# Patient Record
Sex: FEMALE | Race: WHITE | NOT HISPANIC OR LATINO | URBAN - METROPOLITAN AREA
[De-identification: names, ages, dates, MRNs, and addresses within clinical notes are randomized per-mention and may not be internally consistent; named-entity substitution may affect disease eponyms.]

---

## 2017-10-26 ENCOUNTER — 1 YR (OUTPATIENT)
Dept: URBAN - METROPOLITAN AREA CLINIC 27 | Facility: CLINIC | Age: 82
Setting detail: DERMATOLOGY
End: 2017-10-26

## 2017-10-26 DIAGNOSIS — L57.0 ACTINIC KERATOSIS: ICD-10-CM

## 2017-10-26 PROCEDURE — 17003 DESTRUCT PREMALG LES 2-14: CPT

## 2017-10-26 PROCEDURE — 17000 DESTRUCT PREMALG LESION: CPT

## 2017-10-26 PROCEDURE — 99213 OFFICE O/P EST LOW 20 MIN: CPT

## 2018-10-29 ENCOUNTER — 1 YR (OUTPATIENT)
Dept: URBAN - METROPOLITAN AREA CLINIC 27 | Facility: CLINIC | Age: 83
Setting detail: DERMATOLOGY
End: 2018-10-29

## 2018-10-29 DIAGNOSIS — D04.72 CARCINOMA IN SITU OF SKIN OF LEFT LOWER LIMB, INCLUDING HIP: ICD-10-CM

## 2018-10-29 PROCEDURE — 99213 OFFICE O/P EST LOW 20 MIN: CPT

## 2018-10-29 PROCEDURE — 17003 DESTRUCT PREMALG LES 2-14: CPT

## 2018-10-29 PROCEDURE — 17000 DESTRUCT PREMALG LESION: CPT

## 2019-11-19 ENCOUNTER — 1 YR (OUTPATIENT)
Dept: URBAN - METROPOLITAN AREA CLINIC 27 | Facility: CLINIC | Age: 84
Setting detail: DERMATOLOGY
End: 2019-11-19

## 2019-11-19 DIAGNOSIS — L82.1 OTHER SEBORRHEIC KERATOSIS: ICD-10-CM

## 2019-11-19 PROBLEM — R23.3 SPONTANEOUS ECCHYMOSES: Status: RESOLVED | Noted: 2019-11-19

## 2019-11-19 PROBLEM — L30.9 DERMATITIS, UNSPECIFIED: Status: RESOLVED | Noted: 2019-11-19

## 2019-11-19 PROCEDURE — 17000 DESTRUCT PREMALG LESION: CPT

## 2019-11-19 PROCEDURE — 17003 DESTRUCT PREMALG LES 2-14: CPT

## 2019-11-19 PROCEDURE — 99213 OFFICE O/P EST LOW 20 MIN: CPT

## 2020-11-03 ENCOUNTER — 1 YR (OUTPATIENT)
Dept: URBAN - METROPOLITAN AREA CLINIC 27 | Facility: CLINIC | Age: 85
Setting detail: DERMATOLOGY
End: 2020-11-03

## 2020-11-03 DIAGNOSIS — D03.62 MELANOMA IN SITU OF LEFT UPPER LIMB, INCLUDING SHOULDER: ICD-10-CM

## 2020-11-03 PROCEDURE — 99213 OFFICE O/P EST LOW 20 MIN: CPT

## 2021-04-19 LAB — SARS-COV-2, NAA: NEGATIVE

## 2021-05-10 ENCOUNTER — TELEPHONE (OUTPATIENT)
Dept: CARDIOLOGY CLINIC | Age: 86
End: 2021-05-10

## 2021-05-10 ENCOUNTER — OFFICE VISIT (OUTPATIENT)
Dept: CARDIOLOGY CLINIC | Age: 86
End: 2021-05-10
Payer: MEDICARE

## 2021-05-10 VITALS
HEART RATE: 98 BPM | OXYGEN SATURATION: 96 % | WEIGHT: 144 LBS | RESPIRATION RATE: 18 BRPM | BODY MASS INDEX: 23.14 KG/M2 | HEIGHT: 66 IN | SYSTOLIC BLOOD PRESSURE: 146 MMHG | DIASTOLIC BLOOD PRESSURE: 76 MMHG

## 2021-05-10 DIAGNOSIS — I48.0 PAF (PAROXYSMAL ATRIAL FIBRILLATION) (HCC): ICD-10-CM

## 2021-05-10 DIAGNOSIS — J90 PLEURAL EFFUSION: ICD-10-CM

## 2021-05-10 DIAGNOSIS — R58 ECCHYMOSIS ON EXAMINATION: ICD-10-CM

## 2021-05-10 DIAGNOSIS — R09.89 BRUIT OF RIGHT CAROTID ARTERY: ICD-10-CM

## 2021-05-10 DIAGNOSIS — R07.9 CHEST PAIN, UNSPECIFIED TYPE: Primary | ICD-10-CM

## 2021-05-10 DIAGNOSIS — D61.3 IDIOPATHIC APLASTIC ANEMIA (HCC): ICD-10-CM

## 2021-05-10 PROBLEM — D61.9 ANEMIA DUE TO BONE MARROW FAILURE (HCC): Status: ACTIVE | Noted: 2021-05-10

## 2021-05-10 PROCEDURE — 93005 ELECTROCARDIOGRAM TRACING: CPT | Performed by: SPECIALIST

## 2021-05-10 PROCEDURE — G8510 SCR DEP NEG, NO PLAN REQD: HCPCS | Performed by: SPECIALIST

## 2021-05-10 PROCEDURE — G8420 CALC BMI NORM PARAMETERS: HCPCS | Performed by: SPECIALIST

## 2021-05-10 PROCEDURE — 1090F PRES/ABSN URINE INCON ASSESS: CPT | Performed by: SPECIALIST

## 2021-05-10 PROCEDURE — 99205 OFFICE O/P NEW HI 60 MIN: CPT | Performed by: SPECIALIST

## 2021-05-10 PROCEDURE — 93010 ELECTROCARDIOGRAM REPORT: CPT | Performed by: SPECIALIST

## 2021-05-10 PROCEDURE — G8427 DOCREV CUR MEDS BY ELIG CLIN: HCPCS | Performed by: SPECIALIST

## 2021-05-10 PROCEDURE — G8536 NO DOC ELDER MAL SCRN: HCPCS | Performed by: SPECIALIST

## 2021-05-10 PROCEDURE — G0463 HOSPITAL OUTPT CLINIC VISIT: HCPCS | Performed by: SPECIALIST

## 2021-05-10 PROCEDURE — 1101F PT FALLS ASSESS-DOCD LE1/YR: CPT | Performed by: SPECIALIST

## 2021-05-10 RX ORDER — LANOLIN ALCOHOL/MO/W.PET/CERES
325 CREAM (GRAM) TOPICAL
COMMUNITY

## 2021-05-10 RX ORDER — PANTOPRAZOLE SODIUM 40 MG/1
40 TABLET, DELAYED RELEASE ORAL DAILY
COMMUNITY
End: 2021-08-25 | Stop reason: ALTCHOICE

## 2021-05-10 RX ORDER — ASCORBIC ACID 250 MG
250 TABLET ORAL
COMMUNITY

## 2021-05-10 RX ORDER — SUCRALFATE 1 G/1
1 TABLET ORAL 4 TIMES DAILY
COMMUNITY
End: 2021-08-25 | Stop reason: ALTCHOICE

## 2021-05-10 RX ORDER — METOPROLOL TARTRATE 25 MG/1
25 TABLET, FILM COATED ORAL 2 TIMES DAILY
COMMUNITY
End: 2021-06-02

## 2021-05-10 RX ORDER — LEVOTHYROXINE SODIUM 50 UG/1
50 TABLET ORAL
COMMUNITY

## 2021-05-10 RX ORDER — SIMVASTATIN 10 MG/1
TABLET, FILM COATED ORAL
COMMUNITY

## 2021-05-10 RX ORDER — LANOLIN ALCOHOL/MO/W.PET/CERES
3 CREAM (GRAM) TOPICAL
COMMUNITY

## 2021-05-10 RX ORDER — ACETAMINOPHEN 325 MG/1
325 TABLET ORAL
COMMUNITY

## 2021-05-10 RX ORDER — TORSEMIDE 10 MG/1
10 TABLET ORAL DAILY
COMMUNITY
End: 2021-07-20 | Stop reason: ALTCHOICE

## 2021-05-10 RX ORDER — NITROGLYCERIN 0.4 MG/1
0.4 TABLET SUBLINGUAL
COMMUNITY

## 2021-05-10 RX ORDER — LANOLIN ALCOHOL/MO/W.PET/CERES
1000 CREAM (GRAM) TOPICAL DAILY
COMMUNITY

## 2021-05-10 NOTE — TELEPHONE ENCOUNTER
Faxed records request to [facility] in [place] fax # [phone] and phone #[phone] and [phone]

## 2021-05-10 NOTE — PROGRESS NOTES
HISTORY OF PRESENT ILLNESS  Jad Copeland is a 80 y.o. female. She is seen for initial evaluation along with her  and her daughter. She was living in [facility] but spending part of the year in [place]. She has been in and out of the hospital in [place] in the [facility] area for the past 2-1/2 months. She was eventually transported up here by medical transport 10 days ago and now is at an independent living facility. She initially had chest pain in January and went to the hospital and was given a GI cocktail and sent home and did fine for 1 month. She then had recurrent severe chest pain and went back to the hospital in February she was treated for pneumonia and may have had atrial fibrillation at that time but never was told that she had a heart attack. She had an echocardiogram at some point. She was transferred to another hospital for possible catheterization of her heart but this never happened. She did have a stress test done in [place] last December and was told it was normal.  She also has a bruit in her right carotid and there was some thought given to performing endarterectomy. She was told it was a 90% lesion. She has never had symptoms with this. She had profound anemia and underwent upper endoscopy as well as abdominal CT scan but nothing was found. However she was placed on Carafate and Protonix and also iron therapy. She never had a colonoscopy. She has a solitary kidney for unclear reasons. Recent blood work done here in [place] shows normal renal function but does show that she has profound anemia which is not microcytic. Her hemoglobin is 8.2 with hematocrit of 2 5.7%. Platelets are normal although she does have easy bruisability. She also gives a history of thoracentesis at least 2 occasions done recently in [place]. Approximately a liter was drained each time. She was never told that she had heart failure.   She used to take aspirin but it was stopped and she would like to restart it. 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  cyanocobalamin 1,000 mcg tablet Take 1,000 mcg by mouth daily.  ferrous sulfate 325 mg (65 mg iron) tablet Take 325 mg by mouth Daily (before breakfast).  levothyroxine (SYNTHROID) 50 mcg tablet Take 50 mcg by mouth Daily (before breakfast).  melatonin 3 mg tablet Take 3 mg by mouth.  metoprolol tartrate (LOPRESSOR) 25 mg tablet Take 25 mg by mouth two (2) times a day.  nitroglycerin (NITROSTAT) 0.4 mg SL tablet 0.4 mg by SubLINGual route every five (5) minutes as needed for Chest Pain. Up to 3 doses.  pantoprazole (Protonix) 40 mg tablet Take 40 mg by mouth daily.  simvastatin (ZOCOR) 10 mg tablet Take  by mouth nightly.  sucralfate (CARAFATE) 1 gram tablet Take 1 g by mouth four (4) times daily.  torsemide (DEMADEX) 10 mg tablet Take  by mouth daily.  acetaminophen (TylenoL) 325 mg tablet Take 325 mg by mouth every four (4) hours as needed for Pain. No past medical history on file. No past surgical history on file. Review of Systems   Constitutional: Positive for malaise/fatigue. Endo/Heme/Allergies: Bruises/bleeds easily. All other systems reviewed and are negative. Visit Vitals  BP (!) 146/76 (BP 1 Location: Left upper arm, BP Patient Position: Sitting, BP Cuff Size: Adult)   Pulse 98   Resp 18   Ht 5' 6\" (1.676 m)   Wt 144 lb (65.3 kg)   SpO2 96%   BMI 23.24 kg/m²       Physical Exam   Constitutional: She is oriented to person, place, and time. She appears well-nourished.    HENT: Head: Normocephalic. Eyes: Conjunctivae are normal.   Neck: Normal range of motion. Cardiovascular: Normal rate, regular rhythm and normal heart sounds. Exam reveals no gallop and no friction rub. No murmur heard. Pulmonary/Chest: Effort normal. She has no wheezes. She has no rales. Abdominal: Bowel sounds are normal.   Musculoskeletal:         General: No edema. Neurological: She is alert and oriented to person, place, and time. Skin: There is erythema. There is pallor. Psychiatric: Her behavior is normal.   Nursing note and vitals reviewed. ASSESSMENT and PLAN  She has profound anemia of unclear etiology and I suspect this is contributing to her symptoms. I will refer her to a hematologist in this regard. After her hospital discharge in [place] she did see a hematologist and multiple vials of blood were drawn but the daughter does not know the results. We will try to obtain these records today. We will also try to obtain records from her hospitals in [place] before performing any further cardiology testing at this point. She does have a right carotid bruit and I will suggest that she restart a baby aspirin today. She still has fatigue but this may be due to the anemia. The chest pain that she initially had is gone and this may have been due to esophageal spasm. However coronary disease cannot be excluded given her age and her evidence for carotid disease. I will see her back in about 3 weeks time hopefully after information can be obtained from [place].

## 2021-06-02 ENCOUNTER — OFFICE VISIT (OUTPATIENT)
Dept: CARDIOLOGY CLINIC | Age: 86
End: 2021-06-02
Payer: MEDICARE

## 2021-06-02 VITALS
HEIGHT: 66 IN | WEIGHT: 142 LBS | BODY MASS INDEX: 22.82 KG/M2 | DIASTOLIC BLOOD PRESSURE: 78 MMHG | OXYGEN SATURATION: 96 % | SYSTOLIC BLOOD PRESSURE: 138 MMHG | HEART RATE: 84 BPM | RESPIRATION RATE: 18 BRPM

## 2021-06-02 DIAGNOSIS — I48.0 PAF (PAROXYSMAL ATRIAL FIBRILLATION) (HCC): ICD-10-CM

## 2021-06-02 DIAGNOSIS — D64.9 ANEMIA, UNSPECIFIED TYPE: Primary | ICD-10-CM

## 2021-06-02 DIAGNOSIS — R09.89 BRUIT OF RIGHT CAROTID ARTERY: ICD-10-CM

## 2021-06-02 DIAGNOSIS — J90 PLEURAL EFFUSION: ICD-10-CM

## 2021-06-02 DIAGNOSIS — D61.3 IDIOPATHIC APLASTIC ANEMIA (HCC): ICD-10-CM

## 2021-06-02 PROCEDURE — G8420 CALC BMI NORM PARAMETERS: HCPCS | Performed by: SPECIALIST

## 2021-06-02 PROCEDURE — 1101F PT FALLS ASSESS-DOCD LE1/YR: CPT | Performed by: SPECIALIST

## 2021-06-02 PROCEDURE — G8536 NO DOC ELDER MAL SCRN: HCPCS | Performed by: SPECIALIST

## 2021-06-02 PROCEDURE — G8510 SCR DEP NEG, NO PLAN REQD: HCPCS | Performed by: SPECIALIST

## 2021-06-02 PROCEDURE — 99215 OFFICE O/P EST HI 40 MIN: CPT | Performed by: SPECIALIST

## 2021-06-02 PROCEDURE — G0463 HOSPITAL OUTPT CLINIC VISIT: HCPCS | Performed by: SPECIALIST

## 2021-06-02 PROCEDURE — G8427 DOCREV CUR MEDS BY ELIG CLIN: HCPCS | Performed by: SPECIALIST

## 2021-06-02 PROCEDURE — 1090F PRES/ABSN URINE INCON ASSESS: CPT | Performed by: SPECIALIST

## 2021-06-02 RX ORDER — METOPROLOL TARTRATE 50 MG/1
50 TABLET ORAL 2 TIMES DAILY
Qty: 60 TABLET | Refills: 5 | Status: SHIPPED | OUTPATIENT
Start: 2021-06-02 | End: 2021-07-13

## 2021-06-02 NOTE — PROGRESS NOTES
HISTORY OF PRESENT ILLNESS  Guerrero Sullivan is a 80 y.o. female. She was seen recently initially after moving here from [place]. She had multiple recent hospitalizations in the [facility] for atrial fibrillation anemia pneumonia and pleural effusions. I reviewed her records today. She did have episodes of atrial fibrillation noted but was not started on anticoagulation because of a profound anemia. She also had pneumonia and required 2 different thoracenteses. She has seen Dr. Kylie Dunaway who did a bone marrow biopsy and she will get the results in 2 days. Gastrointestinal evaluation showed only esophagitis. She is doing better in general and has more energy although yesterday had some dizziness at rest for unclear reasons lasting several minutes. When I first saw her an EKG showed sinus rhythm. Echocardiography in [place] showed normal heart function. 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 60 Tablet 5    ascorbic acid, vitamin C, (VITAMIN C) 250 mg tablet Take 250 mg by mouth.  cyanocobalamin 1,000 mcg tablet Take 1,000 mcg by mouth daily.  ferrous sulfate 325 mg (65 mg iron) tablet Take 325 mg by mouth Daily (before breakfast).  levothyroxine (SYNTHROID) 50 mcg tablet Take 50 mcg by mouth Daily (before breakfast).  pantoprazole (Protonix) 40 mg tablet Take 40 mg by mouth daily.  simvastatin (ZOCOR) 10 mg tablet Take  by mouth nightly.  sucralfate (CARAFATE) 1 gram tablet Take 1 g by mouth four (4) times daily.  torsemide (DEMADEX) 10 mg tablet Take  by mouth daily.       melatonin 3 mg tablet Take 3 mg by mouth. (Patient not taking: Reported on 6/2/2021)      nitroglycerin (NITROSTAT) 0.4 mg SL tablet 0.4 mg by SubLINGual route every five (5) minutes as needed for Chest Pain. Up to 3 doses. (Patient not taking: Reported on 6/2/2021)      acetaminophen (TylenoL) 325 mg tablet Take 325 mg by mouth every four (4) hours as needed for Pain. (Patient not taking: Reported on 6/2/2021)       No past medical history on file. No past surgical history on file. Review of Systems   Neurological: Positive for dizziness. All other systems reviewed and are negative. Visit Vitals  /78 (BP 1 Location: Right arm, BP Patient Position: Sitting, BP Cuff Size: Adult)   Pulse 84   Resp 18   Ht 5' 6\" (1.676 m)   Wt 142 lb (64.4 kg)   SpO2 96%   BMI 22.92 kg/m²       Physical Exam  Vitals and nursing note reviewed. Constitutional:       Appearance: Normal appearance. She is normal weight. HENT:      Head: Normocephalic. Right Ear: External ear normal.      Left Ear: External ear normal.      Nose: Nose normal.      Mouth/Throat:      Mouth: Mucous membranes are moist.   Eyes:      Extraocular Movements: Extraocular movements intact. Cardiovascular:      Rate and Rhythm: Normal rate and regular rhythm. Occasional extrasystoles are present. Pulses: Normal pulses. Heart sounds: Normal heart sounds. Pulmonary:      Breath sounds: Normal breath sounds. Abdominal:      Palpations: Abdomen is soft. Musculoskeletal:         General: Normal range of motion. Cervical back: Normal range of motion. Skin:     General: Skin is warm. Neurological:      General: No focal deficit present. Mental Status: She is alert. Psychiatric:         Mood and Affect: Mood normal.         ASSESSMENT and PLAN  It is unclear why she has profound anemia. She is having some short burst of what I assume to be atrial ectopic beats on exam.  Otherwise she is regular.   Because of the history of atrial fibrillation I will increase her metoprolol from 25 up to 50 mg twice daily and see her back in 4 to 6 weeks. I will have the results of the bone marrow biopsy at that time. It is possible that she should be placed on anticoagulation as long as there is no evidence for blood loss.

## 2021-07-13 ENCOUNTER — OFFICE VISIT (OUTPATIENT)
Dept: CARDIOLOGY CLINIC | Age: 86
End: 2021-07-13
Payer: MEDICARE

## 2021-07-13 VITALS
HEART RATE: 58 BPM | DIASTOLIC BLOOD PRESSURE: 66 MMHG | BODY MASS INDEX: 23.46 KG/M2 | OXYGEN SATURATION: 97 % | WEIGHT: 146 LBS | HEIGHT: 66 IN | RESPIRATION RATE: 16 BRPM | SYSTOLIC BLOOD PRESSURE: 110 MMHG

## 2021-07-13 DIAGNOSIS — I48.0 PAF (PAROXYSMAL ATRIAL FIBRILLATION) (HCC): ICD-10-CM

## 2021-07-13 DIAGNOSIS — D61.3 IDIOPATHIC APLASTIC ANEMIA (HCC): ICD-10-CM

## 2021-07-13 DIAGNOSIS — I10 ESSENTIAL HYPERTENSION: ICD-10-CM

## 2021-07-13 DIAGNOSIS — I95.1 ORTHOSTATIC HYPOTENSION: Primary | ICD-10-CM

## 2021-07-13 DIAGNOSIS — J90 PLEURAL EFFUSION: ICD-10-CM

## 2021-07-13 DIAGNOSIS — R09.89 BRUIT OF RIGHT CAROTID ARTERY: ICD-10-CM

## 2021-07-13 DIAGNOSIS — D64.9 ANEMIA, UNSPECIFIED TYPE: ICD-10-CM

## 2021-07-13 DIAGNOSIS — R00.2 PALPITATIONS: ICD-10-CM

## 2021-07-13 PROCEDURE — G8536 NO DOC ELDER MAL SCRN: HCPCS | Performed by: SPECIALIST

## 2021-07-13 PROCEDURE — 99214 OFFICE O/P EST MOD 30 MIN: CPT | Performed by: SPECIALIST

## 2021-07-13 PROCEDURE — G0463 HOSPITAL OUTPT CLINIC VISIT: HCPCS | Performed by: SPECIALIST

## 2021-07-13 PROCEDURE — G8427 DOCREV CUR MEDS BY ELIG CLIN: HCPCS | Performed by: SPECIALIST

## 2021-07-13 PROCEDURE — G8510 SCR DEP NEG, NO PLAN REQD: HCPCS | Performed by: SPECIALIST

## 2021-07-13 PROCEDURE — G8420 CALC BMI NORM PARAMETERS: HCPCS | Performed by: SPECIALIST

## 2021-07-13 PROCEDURE — 1090F PRES/ABSN URINE INCON ASSESS: CPT | Performed by: SPECIALIST

## 2021-07-13 PROCEDURE — 1101F PT FALLS ASSESS-DOCD LE1/YR: CPT | Performed by: SPECIALIST

## 2021-07-13 RX ORDER — METOPROLOL TARTRATE 25 MG/1
25 TABLET, FILM COATED ORAL 2 TIMES DAILY
Qty: 180 TABLET | Refills: 3 | Status: SHIPPED | OUTPATIENT
Start: 2021-07-13 | End: 2021-07-27 | Stop reason: SDUPTHER

## 2021-07-13 NOTE — PROGRESS NOTES
HISTORY OF PRESENT ILLNESS  Camron Vasquez is a 80 y.o. female. She has significant anemia diagnosed in [place]. At that time she also had palpitations and possible atrial fibrillation as well as pleural effusions. I did review records from there but cannot locate them at present but it seems to me her heart function was normal then. Recently she has been having some lightheadedness and I reviewed her blood pressures in the office today. They are as high as 832 systolic and as low as 99 systolic. She was on torsemide 10 mg but takes it every other day. I had increased her metoprolol from 25 to 50 mg twice daily for some palpitations but she thinks it is making her blood pressure low and she has been taking the 25 mg. She has been seen by Dr. Jazzy Medellin who did a bone marrow and told her that she may have myelodysplasia or possibly a refractory anemia with ringed sideroblasts. She bruises easily and has 1 kidney. I did review her CBC today which shows hematocrit of 30 or 32% but I do not have any chemistries. 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 180 Tablet 3    ascorbic acid, vitamin C, (VITAMIN C) 250 mg tablet Take 250 mg by mouth.  cyanocobalamin 1,000 mcg tablet Take 1,000 mcg by mouth daily.  ferrous sulfate 325 mg (65 mg iron) tablet Take 325 mg by mouth Daily (before breakfast).  levothyroxine (SYNTHROID) 50 mcg tablet Take 50 mcg by mouth Daily (before breakfast).  nitroglycerin (NITROSTAT) 0.4 mg SL tablet 0.4 mg by SubLINGual route every five (5) minutes as needed for Chest Pain. Up to 3 doses.  simvastatin (ZOCOR) 10 mg tablet Take  by mouth nightly.  torsemide (DEMADEX) 10 mg tablet Take 10 mg by mouth daily. Every other day      melatonin 3 mg tablet Take 3 mg by mouth. (Patient not taking: Reported on 7/13/2021)      pantoprazole (Protonix) 40 mg tablet Take 40 mg by mouth daily. (Patient not taking: Reported on 7/13/2021)      sucralfate (CARAFATE) 1 gram tablet Take 1 g by mouth four (4) times daily. (Patient not taking: Reported on 7/13/2021)      acetaminophen (TylenoL) 325 mg tablet Take 325 mg by mouth every four (4) hours as needed for Pain. (Patient not taking: Reported on 6/2/2021)       No past medical history on file. No past surgical history on file. Review of Systems   Neurological: Positive for dizziness. Endo/Heme/Allergies: Bruises/bleeds easily. All other systems reviewed and are negative. Visit Vitals  /66 (BP 1 Location: Right arm, BP Patient Position: Standing, BP Cuff Size: Adult)   Pulse (!) 58   Resp 16   Ht 5' 6\" (1.676 m)   Wt 146 lb (66.2 kg)   SpO2 97%   BMI 23.57 kg/m²       Physical Exam  Vitals and nursing note reviewed. Constitutional:       Appearance: Normal appearance. She is normal weight. HENT:      Head: Normocephalic. Right Ear: External ear normal.      Left Ear: External ear normal.      Nose: Nose normal.      Mouth/Throat:      Mouth: Mucous membranes are moist.   Eyes:      Extraocular Movements: Extraocular movements intact. Cardiovascular:      Rate and Rhythm: Normal rate and regular rhythm. Occasional extrasystoles are present. Heart sounds: Normal heart sounds. Pulmonary:      Breath sounds: Normal breath sounds. Abdominal:      Palpations: Abdomen is soft. Musculoskeletal:         General: No swelling. Normal range of motion. Cervical back: Normal range of motion. Skin:     Findings: Bruising present. Neurological:      General: No focal deficit present.       Mental Status: She is alert. Psychiatric:         Mood and Affect: Mood normal.         ASSESSMENT and PLAN  It is unclear if she is having recurrence of atrial fibrillation although I do suspect she has an element of sick sinus syndrome. I told her that she can take to metoprolol 25 mg twice daily for now and to try to reduce the torsemide and take it not every other day but maybe once or twice a week. I am not sure she needs this medication. I will order a 48-hour monitor to see if she is having atrial fibrillation. It may be problematic to treat her with an anticoagulant medication due to her easy bruising.   I will also order some electrolytes and a thyroid panel and have her return to clinic for an echocardiogram.

## 2021-07-20 ENCOUNTER — ANCILLARY PROCEDURE (OUTPATIENT)
Dept: CARDIOLOGY CLINIC | Age: 86
End: 2021-07-20
Payer: MEDICARE

## 2021-07-20 ENCOUNTER — CLINICAL SUPPORT (OUTPATIENT)
Dept: CARDIOLOGY CLINIC | Age: 86
End: 2021-07-20

## 2021-07-20 ENCOUNTER — OFFICE VISIT (OUTPATIENT)
Dept: CARDIOLOGY CLINIC | Age: 86
End: 2021-07-20
Payer: MEDICARE

## 2021-07-20 VITALS
BODY MASS INDEX: 23.46 KG/M2 | OXYGEN SATURATION: 100 % | DIASTOLIC BLOOD PRESSURE: 70 MMHG | RESPIRATION RATE: 18 BRPM | HEART RATE: 74 BPM | WEIGHT: 146 LBS | SYSTOLIC BLOOD PRESSURE: 142 MMHG | HEIGHT: 66 IN

## 2021-07-20 VITALS
DIASTOLIC BLOOD PRESSURE: 66 MMHG | SYSTOLIC BLOOD PRESSURE: 110 MMHG | WEIGHT: 146 LBS | HEIGHT: 66 IN | BODY MASS INDEX: 23.46 KG/M2

## 2021-07-20 DIAGNOSIS — I48.0 PAF (PAROXYSMAL ATRIAL FIBRILLATION) (HCC): ICD-10-CM

## 2021-07-20 DIAGNOSIS — R00.2 PALPITATIONS: Primary | ICD-10-CM

## 2021-07-20 DIAGNOSIS — D61.9 ANEMIA DUE TO BONE MARROW FAILURE, UNSPECIFIED BONE MARROW FAILURE TYPE (HCC): ICD-10-CM

## 2021-07-20 DIAGNOSIS — R09.89 BRUIT OF RIGHT CAROTID ARTERY: ICD-10-CM

## 2021-07-20 DIAGNOSIS — R00.2 PALPITATIONS: ICD-10-CM

## 2021-07-20 DIAGNOSIS — I95.1 ORTHOSTATIC HYPOTENSION: ICD-10-CM

## 2021-07-20 DIAGNOSIS — D61.3 IDIOPATHIC APLASTIC ANEMIA (HCC): ICD-10-CM

## 2021-07-20 DIAGNOSIS — I10 ESSENTIAL HYPERTENSION: ICD-10-CM

## 2021-07-20 DIAGNOSIS — J90 PLEURAL EFFUSION: ICD-10-CM

## 2021-07-20 LAB
ECHO AO ASC DIAM: 2.79 CM
ECHO AO ROOT DIAM: 2.96 CM
ECHO AV AREA PEAK VELOCITY: 2.48 CM2
ECHO AV AREA VTI: 2.25 CM2
ECHO AV AREA/BSA PEAK VELOCITY: 1.4 CM2/M2
ECHO AV AREA/BSA VTI: 1.3 CM2/M2
ECHO AV MEAN GRADIENT: 5.75 MMHG
ECHO AV PEAK GRADIENT: 11 MMHG
ECHO AV PEAK VELOCITY: 165.83 CM/S
ECHO AV VTI: 44.39 CM
ECHO EST RA PRESSURE: 3 MMHG
ECHO IVC PROX: 1.83 CM
ECHO LA AREA 4C: 25.77 CM2
ECHO LA MAJOR AXIS: 3.27 CM
ECHO LA MINOR AXIS: 1.87 CM
ECHO LA VOL 2C: 63 ML (ref 22–52)
ECHO LA VOL 4C: 82.16 ML (ref 22–52)
ECHO LA VOL BP: 76.22 ML (ref 22–52)
ECHO LA VOL/BSA BIPLANE: 43.55 ML/M2 (ref 16–28)
ECHO LA VOLUME INDEX A2C: 36 ML/M2 (ref 16–28)
ECHO LA VOLUME INDEX A4C: 46.95 ML/M2 (ref 16–28)
ECHO LV EDV A2C: 117.22 ML
ECHO LV EDV A4C: 97.59 ML
ECHO LV EDV BP: 107.38 ML (ref 56–104)
ECHO LV EDV INDEX A4C: 55.8 ML/M2
ECHO LV EDV INDEX BP: 61.4 ML/M2
ECHO LV EDV NDEX A2C: 67 ML/M2
ECHO LV EJECTION FRACTION A2C: 64 PERCENT
ECHO LV EJECTION FRACTION A4C: 52 PERCENT
ECHO LV EJECTION FRACTION BIPLANE: 58.2 PERCENT (ref 55–100)
ECHO LV ESV A2C: 42.74 ML
ECHO LV ESV A4C: 46.85 ML
ECHO LV ESV BP: 44.93 ML (ref 19–49)
ECHO LV ESV INDEX A2C: 24.4 ML/M2
ECHO LV ESV INDEX A4C: 26.8 ML/M2
ECHO LV ESV INDEX BP: 25.7 ML/M2
ECHO LV INTERNAL DIMENSION DIASTOLIC: 4.34 CM (ref 3.9–5.3)
ECHO LV INTERNAL DIMENSION SYSTOLIC: 2.97 CM
ECHO LV IVSD: 0.96 CM (ref 0.6–0.9)
ECHO LV MASS 2D: 147.6 G (ref 67–162)
ECHO LV MASS INDEX 2D: 84.4 G/M2 (ref 43–95)
ECHO LV POSTERIOR WALL DIASTOLIC: 1.07 CM (ref 0.6–0.9)
ECHO LVOT DIAM: 1.98 CM
ECHO LVOT PEAK GRADIENT: 7.06 MMHG
ECHO LVOT PEAK VELOCITY: 132.9 CM/S
ECHO LVOT SV: 99.9 ML
ECHO LVOT VTI: 32.35 CM
ECHO RA MAJOR AXIS: 3.69 CM
ECHO RIGHT VENTRICULAR SYSTOLIC PRESSURE (RVSP): 33 MMHG
ECHO RV INTERNAL DIMENSION: 3.58 CM
ECHO RV TAPSE: 2.38 CM (ref 1.5–2)
ECHO TV REGURGITANT MAX VELOCITY: 275.72 CM/S
ECHO TV REGURGITANT PEAK GRADIENT: 30.43 MMHG
LA VOL DISK BP: 72.56 ML (ref 22–52)
LVOT MG: 3.56 MMHG

## 2021-07-20 PROCEDURE — 1090F PRES/ABSN URINE INCON ASSESS: CPT | Performed by: SPECIALIST

## 2021-07-20 PROCEDURE — 93227 XTRNL ECG REC<48 HR R&I: CPT | Performed by: SPECIALIST

## 2021-07-20 PROCEDURE — G8536 NO DOC ELDER MAL SCRN: HCPCS | Performed by: SPECIALIST

## 2021-07-20 PROCEDURE — 1101F PT FALLS ASSESS-DOCD LE1/YR: CPT | Performed by: SPECIALIST

## 2021-07-20 PROCEDURE — G8420 CALC BMI NORM PARAMETERS: HCPCS | Performed by: SPECIALIST

## 2021-07-20 PROCEDURE — G8427 DOCREV CUR MEDS BY ELIG CLIN: HCPCS | Performed by: SPECIALIST

## 2021-07-20 PROCEDURE — G8510 SCR DEP NEG, NO PLAN REQD: HCPCS | Performed by: SPECIALIST

## 2021-07-20 PROCEDURE — 93306 TTE W/DOPPLER COMPLETE: CPT | Performed by: SPECIALIST

## 2021-07-20 PROCEDURE — G0463 HOSPITAL OUTPT CLINIC VISIT: HCPCS | Performed by: SPECIALIST

## 2021-07-20 PROCEDURE — 99214 OFFICE O/P EST MOD 30 MIN: CPT | Performed by: SPECIALIST

## 2021-07-20 NOTE — PROGRESS NOTES
HISTORY OF PRESENT ILLNESS  Kofi Mendez is a 80 y.o. female. 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 She had a severe illness earlier this year in [place] with pleural effusions and paroxysmal atrial fibrillation. She has had easy bruising and some palpitations and lightheadedness. She has been on torsemide and the dose has been decreased so that she is only taking it twice a week. Her renal function is normal and an echocardiogram done in the office today shows normal left ventricular function. A Holter monitor was applied today. 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 180 Tablet 3    ascorbic acid, vitamin C, (VITAMIN C) 250 mg tablet Take 250 mg by mouth.  cyanocobalamin 1,000 mcg tablet Take 1,000 mcg by mouth daily.  ferrous sulfate 325 mg (65 mg iron) tablet Take 325 mg by mouth Daily (before breakfast).  levothyroxine (SYNTHROID) 50 mcg tablet Take 50 mcg by mouth Daily (before breakfast).  melatonin 3 mg tablet Take 3 mg by mouth.  nitroglycerin (NITROSTAT) 0.4 mg SL tablet 0.4 mg by SubLINGual route every five (5) minutes as needed for Chest Pain. Up to 3 doses.  simvastatin (ZOCOR) 10 mg tablet Take  by mouth nightly.  sucralfate (CARAFATE) 1 gram tablet Take 1 g by mouth four (4) times daily.  pantoprazole (Protonix) 40 mg tablet Take 40 mg by mouth daily. (Patient not taking: Reported on 7/13/2021)      acetaminophen (TylenoL) 325 mg tablet Take 325 mg by mouth every four (4) hours as needed for Pain.  (Patient not taking: Reported on 7/20/2021)       No past medical history on file. No past surgical history on file. Review of Systems   Neurological: Positive for dizziness. Endo/Heme/Allergies: Bruises/bleeds easily. All other systems reviewed and are negative. Visit Vitals  BP (!) 142/70 (BP 1 Location: Right arm, BP Patient Position: Sitting, BP Cuff Size: Adult)   Pulse 74   Resp 18   Ht 5' 6\" (1.676 m)   Wt 146 lb (66.2 kg)   SpO2 100%   BMI 23.57 kg/m²       Physical Exam  Vitals and nursing note reviewed. Constitutional:       Appearance: She is normal weight. HENT:      Head: Normocephalic. Right Ear: External ear normal.      Left Ear: External ear normal.      Nose: Nose normal.      Mouth/Throat:      Mouth: Mucous membranes are moist.   Eyes:      Extraocular Movements: Extraocular movements intact. Cardiovascular:      Rate and Rhythm: Normal rate and regular rhythm. Pulmonary:      Effort: Pulmonary effort is normal.   Abdominal:      Palpations: Abdomen is soft. Musculoskeletal:         General: Normal range of motion. Cervical back: Normal range of motion. Skin:     General: Skin is warm. Neurological:      General: No focal deficit present. Mental Status: She is alert. Psychiatric:         Mood and Affect: Mood normal.         ASSESSMENT and PLAN  Sometimes her blood pressure at home goes down less than 685 systolic. I do not think she needs the torsemide and I will have her stop it. We will see what her monitor shows. Currently she is in sinus rhythm on a beta-blocker and has not had recurrence of atrial fibrillation. She was not anticoagulated due to comorbid conditions. I will see her back in about 1 month.

## 2021-07-27 ENCOUNTER — TELEPHONE (OUTPATIENT)
Dept: CARDIOLOGY CLINIC | Age: 86
End: 2021-07-27

## 2021-07-27 DIAGNOSIS — R00.2 PALPITATIONS: Primary | ICD-10-CM

## 2021-07-27 RX ORDER — METOPROLOL TARTRATE 25 MG/1
25 TABLET, FILM COATED ORAL 2 TIMES DAILY
Qty: 180 TABLET | Refills: 3 | Status: SHIPPED | OUTPATIENT
Start: 2021-07-27

## 2021-07-27 NOTE — TELEPHONE ENCOUNTER
Requested Prescriptions     Signed Prescriptions Disp Refills    metoprolol tartrate (LOPRESSOR) 25 mg tablet 180 Tablet 3     Sig: Take 1 Tablet by mouth two (2) times a day.      Authorizing Provider: Yessica Santoyo     Ordering User: Mike Perales    Per Dr. Chen Hsu verbal order

## 2021-07-27 NOTE — TELEPHONE ENCOUNTER
Patient states she was informed by [facility] that prescription for 50 mg metoprolol has been canceled , but they have not received new prescription for 25 mg. Patient requesting new prescription to be sent.     Phone: [phone]

## 2021-07-28 ENCOUNTER — TELEPHONE (OUTPATIENT)
Dept: CARDIOLOGY CLINIC | Age: 86
End: 2021-07-28

## 2021-07-28 NOTE — TELEPHONE ENCOUNTER
Dr. Misty Cole-  Patient's holter monitor results are on your desk for review.     Future Appointments   Date Time Provider Dajuan Steven   8/25/2021 11:40 AM MD [facility]

## 2021-07-28 NOTE — TELEPHONE ENCOUNTER
Spoke to patient. She wanted me to remind Dr. Eleonora Marinelli she was taking metoprolol 50 mg bid, but her HR was getting too low. She does not want to take 50 mg for this reason and said Dr. Eleonora Marinelli had told her okay to decrease to 25 mg bid when she was here. I told her I would message him and let her know his thoughts tomorrow. Patient verbalized understanding and denied further questions or concerns.

## 2021-07-28 NOTE — TELEPHONE ENCOUNTER
----- Message from Thalia Hall MD sent at 7/28/2021 12:28 PM EDT -----  No AF on monitor but occasional fast HR. Suggest increasing metoprolol to 50 BID (currently 25 bid), can give new Rx.

## 2021-07-29 NOTE — TELEPHONE ENCOUNTER
Called patient.  Left detailed message stating okay to continue lower dose and keep f/u later in August.

## 2021-08-25 ENCOUNTER — OFFICE VISIT (OUTPATIENT)
Dept: CARDIOLOGY CLINIC | Age: 86
End: 2021-08-25
Payer: MEDICARE

## 2021-08-25 VITALS
WEIGHT: 143 LBS | HEIGHT: 66 IN | SYSTOLIC BLOOD PRESSURE: 142 MMHG | BODY MASS INDEX: 22.98 KG/M2 | DIASTOLIC BLOOD PRESSURE: 70 MMHG | OXYGEN SATURATION: 97 % | RESPIRATION RATE: 16 BRPM | HEART RATE: 83 BPM

## 2021-08-25 DIAGNOSIS — R00.2 PALPITATIONS: ICD-10-CM

## 2021-08-25 DIAGNOSIS — R09.89 BRUIT OF RIGHT CAROTID ARTERY: ICD-10-CM

## 2021-08-25 DIAGNOSIS — I49.5 SSS (SICK SINUS SYNDROME) (HCC): ICD-10-CM

## 2021-08-25 DIAGNOSIS — D61.9 ANEMIA DUE TO BONE MARROW FAILURE, UNSPECIFIED BONE MARROW FAILURE TYPE (HCC): ICD-10-CM

## 2021-08-25 DIAGNOSIS — J90 PLEURAL EFFUSION: ICD-10-CM

## 2021-08-25 DIAGNOSIS — I95.1 ORTHOSTATIC HYPOTENSION: ICD-10-CM

## 2021-08-25 DIAGNOSIS — I48.0 PAF (PAROXYSMAL ATRIAL FIBRILLATION) (HCC): ICD-10-CM

## 2021-08-25 DIAGNOSIS — I47.1 PAROXYSMAL SVT (SUPRAVENTRICULAR TACHYCARDIA) (HCC): Primary | ICD-10-CM

## 2021-08-25 DIAGNOSIS — D61.3 IDIOPATHIC APLASTIC ANEMIA (HCC): ICD-10-CM

## 2021-08-25 DIAGNOSIS — I10 ESSENTIAL HYPERTENSION: ICD-10-CM

## 2021-08-25 PROCEDURE — 1090F PRES/ABSN URINE INCON ASSESS: CPT | Performed by: SPECIALIST

## 2021-08-25 PROCEDURE — G8536 NO DOC ELDER MAL SCRN: HCPCS | Performed by: SPECIALIST

## 2021-08-25 PROCEDURE — G8427 DOCREV CUR MEDS BY ELIG CLIN: HCPCS | Performed by: SPECIALIST

## 2021-08-25 PROCEDURE — G0463 HOSPITAL OUTPT CLINIC VISIT: HCPCS | Performed by: SPECIALIST

## 2021-08-25 PROCEDURE — G8420 CALC BMI NORM PARAMETERS: HCPCS | Performed by: SPECIALIST

## 2021-08-25 PROCEDURE — G8510 SCR DEP NEG, NO PLAN REQD: HCPCS | Performed by: SPECIALIST

## 2021-08-25 PROCEDURE — 1101F PT FALLS ASSESS-DOCD LE1/YR: CPT | Performed by: SPECIALIST

## 2021-08-25 PROCEDURE — 99214 OFFICE O/P EST MOD 30 MIN: CPT | Performed by: SPECIALIST

## 2021-08-25 RX ORDER — ASPIRIN 81 MG/1
81 TABLET ORAL DAILY
COMMUNITY

## 2021-08-25 NOTE — PROGRESS NOTES
HISTORY OF PRESENT ILLNESS  Jose Aleman is a 80 y.o. female. She has aplastic anemia and a history of paroxysmal atrial fibrillation occurring in [place]. She has treated hypertension and probable sick sinus syndrome. Her hemoglobin is apparently in the range of 10 or 11. She had a Holter monitor that showed 10 runs of supraventricular tachycardia with the longest being 7 beats in duration and the fastest being 131 bpm.  There were no episodes of atrial fibrillation. On very low-dose metoprolol her heart rate to my exam today is 60 bpm with sinus arrhythmia. On her own she stopped taking the Carafate and Protonix. She is feeling quite well. She has only rare palpitations. 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  metoprolol tartrate (LOPRESSOR) 25 mg tablet Take 1 Tablet by mouth two (2) times a day. 180 Tablet 3    ascorbic acid, vitamin C, (VITAMIN C) 250 mg tablet Take 250 mg by mouth.  cyanocobalamin 1,000 mcg tablet Take 1,000 mcg by mouth daily.  ferrous sulfate 325 mg (65 mg iron) tablet Take 325 mg by mouth Daily (before breakfast).  levothyroxine (SYNTHROID) 50 mcg tablet Take 50 mcg by mouth Daily (before breakfast).  nitroglycerin (NITROSTAT) 0.4 mg SL tablet 0.4 mg by SubLINGual route every five (5) minutes as needed for Chest Pain. Up to 3 doses.  simvastatin (ZOCOR) 10 mg tablet Take  by mouth nightly.  melatonin 3 mg tablet Take 3 mg by mouth.  (Patient not taking: Reported on 8/25/2021)      acetaminophen (TylenoL) 325 mg tablet Take 325 mg by mouth every four (4) hours as needed for Pain. (Patient not taking: Reported on 7/20/2021)       No past medical history on file. No past surgical history on file. Review of Systems   Cardiovascular: Positive for palpitations. All other systems reviewed and are negative. Visit Vitals  BP (!) 142/70 (BP 1 Location: Right arm, BP Patient Position: Sitting, BP Cuff Size: Adult)   Pulse 83   Resp 16   Ht 5' 6\" (1.676 m)   Wt 143 lb (64.9 kg)   SpO2 97%   BMI 23.08 kg/m²       Physical Exam  Vitals and nursing note reviewed. Constitutional:       Appearance: She is normal weight. HENT:      Head: Normocephalic. Right Ear: External ear normal.      Left Ear: External ear normal.      Nose: Nose normal.      Mouth/Throat:      Mouth: Mucous membranes are moist.   Eyes:      Extraocular Movements: Extraocular movements intact. Cardiovascular:      Rate and Rhythm: Normal rate and regular rhythm. Heart sounds: Normal heart sounds. Pulmonary:      Breath sounds: Normal breath sounds. Abdominal:      Palpations: Abdomen is soft. Musculoskeletal:         General: Normal range of motion. Cervical back: Normal range of motion. Skin:     General: Skin is warm. Neurological:      General: No focal deficit present. Mental Status: She is alert. Psychiatric:         Behavior: Behavior normal.         ASSESSMENT and PLAN  I believe she has an element of sick sinus syndrome but currently is not having any atrial fibrillation on the low-dose of the beta-blocker. I do not think she can take a higher dose of metoprolol. She was not on oral anticoagulation because of comorbid conditions.   For now I will continue this regimen and plan to see her back in 4 months time

## 2022-03-19 PROBLEM — J90 PLEURAL EFFUSION: Status: ACTIVE | Noted: 2021-05-10

## 2022-03-20 PROBLEM — R09.89 BRUIT OF RIGHT CAROTID ARTERY: Status: ACTIVE | Noted: 2021-05-10

## 2022-03-20 PROBLEM — I48.0 PAF (PAROXYSMAL ATRIAL FIBRILLATION) (HCC): Status: ACTIVE | Noted: 2021-05-10

## 2022-03-20 PROBLEM — D61.9 ANEMIA DUE TO BONE MARROW FAILURE (HCC): Status: ACTIVE | Noted: 2021-05-10

## 2022-03-20 PROBLEM — R58 ECCHYMOSIS ON EXAMINATION: Status: ACTIVE | Noted: 2021-05-10

## 2023-05-14 RX ORDER — SIMVASTATIN 10 MG
TABLET ORAL
COMMUNITY

## 2023-05-14 RX ORDER — FERROUS SULFATE 325(65) MG
325 TABLET ORAL
COMMUNITY

## 2023-05-14 RX ORDER — ASPIRIN 81 MG/1
81 TABLET ORAL DAILY
COMMUNITY

## 2023-05-14 RX ORDER — ACETAMINOPHEN 325 MG/1
325 TABLET ORAL EVERY 4 HOURS PRN
COMMUNITY

## 2023-05-14 RX ORDER — LANOLIN ALCOHOL/MO/W.PET/CERES
3 CREAM (GRAM) TOPICAL
COMMUNITY

## 2023-05-14 RX ORDER — ASCORBIC ACID 250 MG
250 TABLET ORAL
COMMUNITY

## 2023-05-14 RX ORDER — LEVOTHYROXINE SODIUM 0.05 MG/1
50 TABLET ORAL
COMMUNITY

## 2023-05-14 RX ORDER — NITROGLYCERIN 0.4 MG/1
0.4 TABLET SUBLINGUAL
COMMUNITY